# Patient Record
Sex: FEMALE | Race: BLACK OR AFRICAN AMERICAN | NOT HISPANIC OR LATINO | URBAN - METROPOLITAN AREA
[De-identification: names, ages, dates, MRNs, and addresses within clinical notes are randomized per-mention and may not be internally consistent; named-entity substitution may affect disease eponyms.]

---

## 2021-07-16 ENCOUNTER — OFFICE VISIT (OUTPATIENT)
Dept: URBAN - METROPOLITAN AREA CLINIC 70 | Facility: CLINIC | Age: 41
End: 2021-07-16
Payer: COMMERCIAL

## 2021-07-16 ENCOUNTER — WEB ENCOUNTER (OUTPATIENT)
Dept: URBAN - METROPOLITAN AREA CLINIC 70 | Facility: CLINIC | Age: 41
End: 2021-07-16

## 2021-07-16 ENCOUNTER — LAB OUTSIDE AN ENCOUNTER (OUTPATIENT)
Dept: URBAN - METROPOLITAN AREA CLINIC 70 | Facility: CLINIC | Age: 41
End: 2021-07-16

## 2021-07-16 VITALS
TEMPERATURE: 97.9 F | HEIGHT: 63 IN | BODY MASS INDEX: 24.49 KG/M2 | DIASTOLIC BLOOD PRESSURE: 77 MMHG | SYSTOLIC BLOOD PRESSURE: 121 MMHG | WEIGHT: 138.2 LBS | HEART RATE: 93 BPM

## 2021-07-16 DIAGNOSIS — R10.84 ABDOMINAL CRAMPING, GENERALIZED: ICD-10-CM

## 2021-07-16 DIAGNOSIS — K59.09 CHRONIC CONSTIPATION: ICD-10-CM

## 2021-07-16 PROCEDURE — 99203 OFFICE O/P NEW LOW 30 MIN: CPT | Performed by: PHYSICIAN ASSISTANT

## 2021-07-16 NOTE — HPI-TODAY'S VISIT:
Pt is a 41 yr old female who presents for left sided abdominal pain. Pt states she hernia repair with tummy tuck on 5/25/21.  Denies any s/p surgical complciations and has had f/u visit with surgeon. Reports left sided abdominal pain which began 6 days ago.  Feels like she is cramping in left abdomen intermittently and has gas. Had episode x1 of N/V 6 days ago as well.  Has soft/lumpy stool every other day. States she did take opioids for about 10 days after procedure and took a narcotic. States she does tend to be constipated and takes prunes daily. Denies rectal bleeding, abdnormal wt loss, or primary family member hx of colon cancer.  States she took 1 bottle of Magnesium Citrate last night per her surgeon's recommendation and has had good results with frequent BM's.

## 2021-08-09 ENCOUNTER — LAB OUTSIDE AN ENCOUNTER (OUTPATIENT)
Dept: URBAN - METROPOLITAN AREA CLINIC 70 | Facility: CLINIC | Age: 41
End: 2021-08-09

## 2021-08-09 ENCOUNTER — OFFICE VISIT (OUTPATIENT)
Dept: URBAN - METROPOLITAN AREA CLINIC 70 | Facility: CLINIC | Age: 41
End: 2021-08-09
Payer: COMMERCIAL

## 2021-08-09 ENCOUNTER — DASHBOARD ENCOUNTERS (OUTPATIENT)
Age: 41
End: 2021-08-09

## 2021-08-09 DIAGNOSIS — K59.01 CONSTIPATION BY DELAYED COLONIC TRANSIT: ICD-10-CM

## 2021-08-09 DIAGNOSIS — R10.9 LEFT SIDED ABDOMINAL PAIN: ICD-10-CM

## 2021-08-09 DIAGNOSIS — R19.4 CHANGE IN BOWEL HABITS: ICD-10-CM

## 2021-08-09 PROCEDURE — 99213 OFFICE O/P EST LOW 20 MIN: CPT | Performed by: NURSE PRACTITIONER

## 2021-08-09 NOTE — HPI-TODAY'S VISIT:
Pt presents today for f/u visit regarding abdominal pain. CT A/P on 7/30/21 found mild hepatomegaly, liver cyst without biliary ductal dilation and no gallstones. Multiple non-obstructive left renal caliceal calculi of up to 4mm. LOV Miralax daily was recommended. Today pt reports improvement in BM's which are now soft and every other day.  Continues to eat prunes daily. Drinks more water daily.  Is not currently taking Miralax. Reports resolution in abdominal pain, but concerned as this abdominal pain was new as well as the change in BM's for her and requests a colonoscopy.             -OF NOTE LOV 7/16/21: Pt is a 41 yr old female who presents for left sided abdominal pain. Pt states she hernia repair with tummy tuck on 5/25/21.  Denies any s/p surgical complciations and has had f/u visit with surgeon. Reports left sided abdominal pain which began 6 days ago.  Feels like she is cramping in left abdomen intermittently and has gas. Had episode x1 of N/V 6 days ago as well.  Has soft/lumpy stool every other day. States she did take opioids for about 10 days after procedure and took a narcotic. States she does tend to be constipated and takes prunes daily. Denies rectal bleeding, abdnormal wt loss, or primary family member hx of colon cancer.  States she took 1 bottle of Magnesium Citrate last night per her surgeon's recommendation and has had good results with frequent BM's.

## 2021-08-10 PROBLEM — 35298007: Status: ACTIVE | Noted: 2021-07-16

## 2021-09-01 ENCOUNTER — OFFICE VISIT (OUTPATIENT)
Dept: URBAN - METROPOLITAN AREA SURGERY CENTER 17 | Facility: SURGERY CENTER | Age: 41
End: 2021-09-01